# Patient Record
Sex: MALE | Race: WHITE | HISPANIC OR LATINO | URBAN - METROPOLITAN AREA
[De-identification: names, ages, dates, MRNs, and addresses within clinical notes are randomized per-mention and may not be internally consistent; named-entity substitution may affect disease eponyms.]

---

## 2017-11-03 ENCOUNTER — EMERGENCY (EMERGENCY)
Facility: HOSPITAL | Age: 27
LOS: 1 days | Discharge: HOME | End: 2017-11-03
Attending: EMERGENCY MEDICINE | Admitting: EMERGENCY MEDICINE
Payer: SELF-PAY

## 2017-11-03 VITALS
RESPIRATION RATE: 20 BRPM | SYSTOLIC BLOOD PRESSURE: 126 MMHG | DIASTOLIC BLOOD PRESSURE: 79 MMHG | OXYGEN SATURATION: 98 % | HEART RATE: 78 BPM

## 2017-11-03 VITALS
SYSTOLIC BLOOD PRESSURE: 124 MMHG | HEIGHT: 70 IN | DIASTOLIC BLOOD PRESSURE: 79 MMHG | RESPIRATION RATE: 20 BRPM | HEART RATE: 70 BPM | TEMPERATURE: 98 F | OXYGEN SATURATION: 97 % | WEIGHT: 265 LBS

## 2017-11-03 PROCEDURE — 99283 EMERGENCY DEPT VISIT LOW MDM: CPT

## 2017-11-03 PROCEDURE — 71046 X-RAY EXAM CHEST 2 VIEWS: CPT

## 2017-11-03 PROCEDURE — 71020: CPT | Mod: 26

## 2017-11-03 PROCEDURE — 99283 EMERGENCY DEPT VISIT LOW MDM: CPT | Mod: 25

## 2017-11-03 RX ORDER — IBUPROFEN 200 MG
600 TABLET ORAL ONCE
Qty: 0 | Refills: 0 | Status: COMPLETED | OUTPATIENT
Start: 2017-11-03 | End: 2017-11-03

## 2017-11-03 NOTE — ED STATDOCS - PROGRESS NOTE DETAILS
PA NOTE: pt seen and evaluated by intake doctor.lungs CTABL  x ray reviewed. pt counseled on possible findings on final read. pt requesting pain control. will give ibuprofen. pt advised to follow up with [facility]. All questions answered and concerns addressed. pt verbalized understanding and agreement with plan and dx. pt advised to follow up with PMD. pt advised to return to ed for worsening symptoms including fever, cp, sob. will dc.

## 2017-11-03 NOTE — ED STATDOCS - CONDUCTED A DETAILED DISCUSSION WITH PATIENT AND/OR GUARDIAN REGARDING, MDM
return to ED if symptoms worsen, persist or questions arise return to ED if symptoms worsen, persist or questions arise/radiology results/need for outpatient follow-up

## 2017-11-03 NOTE — ED STATDOCS - EYES, MLM
clear bilaterally.  Pupils equal, round, and reactive to light. clear bilaterally.  Pupils equal, round, and reactive to light.  + bilateral subconjunctival hemorrhages.

## 2017-11-03 NOTE — ED STATDOCS - CARE PLAN
Principal Discharge DX:	Choking Principal Discharge DX:	Choking  Secondary Diagnosis:	Subconjunctival hemorrhage of both eyes  Secondary Diagnosis:	Petechial hemorrhage

## 2017-11-03 NOTE — ED STATDOCS - ATTENDING CONTRIBUTION TO CARE
I, Aracelis Saini, performed the initial face to face bedside interview with this patient regarding history of present illness, review of symptoms and relevant past medical, social and family history.  I completed an independent physical examination.  I was the initial provider who evaluated this patient. I have signed out the follow up of any pending tests (i.e. labs, radiological studies) to the ACP.  I have communicated the patient’s plan of care and disposition with the ACP.

## 2017-11-03 NOTE — ED ADULT NURSE REASSESSMENT NOTE - NS ED NURSE REASSESS COMMENT FT1
Patient verbalized understanding of discharge instructions, need for followup.  Patient also provided with signs and symptoms to return to the emergency department immediately.  Patient A+Ox3, resps even and nonlabored, patient in no apparent distress.  Patient verbalized understanding, at this time patient has no further questions. discharged before full assessment note

## 2017-11-03 NOTE — ED STATDOCS - OBJECTIVE STATEMENT
28 y/o M pt with a PMHx of drug abuse and psychiatric Hx presents to the ED s/p choking episode s/p eating a sandwich 2 hours ago. The episode of choking has resolved but he now reports that he "is not returning to normal color." States that initially he was unable to swallow the piece of peanut butter sandwich but he was able to cough, vomit/swallow the food. Reports that he was coughing and feeling SOB during the event. Pt explains that he believes he was choking for 15 minutes. He was unable to call for help, he was home alone. This sensation has resolved now. Non smoker, social drinker. Denies HA, numbness, tingling, photophobia, diplopia, change in vision/hearing/gait/mental status/speech, focal weakness, neck pain, fever, chills, stiffness, hx of DVT/PE, leg swelling, CP, D/C, abdominal pain, change in urinary/bowel function, dysuria, hematuria, flank pain, or motor/sensory deficits. NKDA. 26 y/o M pt with a PMHx of drug abuse and psychiatric Hx presents to the ED s/p choking episode s/p eating a sandwich 2 hours ago. The episode of choking has resolved but he now reports that he "is not returning to normal color." States that initially he was unable to swallow the piece of peanut butter sandwich but he was able to cough, vomit/swallow the food. Reports that he was coughing and feeling SOB during the event. Pt explains that he believes he was choking for 15 minutes. He was unable to call for help, he was home alone. This sensation has resolved now. Non smoker, social drinker. Denies HA, numbness, tingling, photophobia, diplopia, change in vision/hearing/gait/mental status/speech, focal weakness, neck pain, fever, chills, stiffness, hx of DVT/PE, leg swelling, CP, D/C, abdominal pain, change in urinary/bowel function, dysuria, hematuria, flank pain, or motor/sensory deficits. NKDA.  He noted bleeding into his conjunctiva and facial color change and came in for eval. No sob at present.

## 2018-10-07 ENCOUNTER — EMERGENCY (EMERGENCY)
Facility: HOSPITAL | Age: 28
LOS: 1 days | Discharge: HOME | End: 2018-10-07
Payer: SELF-PAY

## 2018-10-07 DIAGNOSIS — R60.0 LOCALIZED EDEMA: ICD-10-CM

## 2018-10-07 PROCEDURE — 82140 ASSAY OF AMMONIA: CPT

## 2018-10-07 PROCEDURE — 71046 X-RAY EXAM CHEST 2 VIEWS: CPT | Mod: 26

## 2018-10-07 PROCEDURE — 81003 URINALYSIS AUTO W/O SCOPE: CPT

## 2018-10-07 PROCEDURE — 83880 ASSAY OF NATRIURETIC PEPTIDE: CPT

## 2018-10-07 PROCEDURE — 99284 EMERGENCY DEPT VISIT MOD MDM: CPT

## 2018-10-07 PROCEDURE — 99283 EMERGENCY DEPT VISIT LOW MDM: CPT | Mod: 25

## 2018-10-07 PROCEDURE — 71046 X-RAY EXAM CHEST 2 VIEWS: CPT

## 2018-10-07 PROCEDURE — 36415 COLL VENOUS BLD VENIPUNCTURE: CPT

## 2018-10-07 PROCEDURE — 82150 ASSAY OF AMYLASE: CPT

## 2018-10-07 PROCEDURE — 85027 COMPLETE CBC AUTOMATED: CPT

## 2018-10-07 PROCEDURE — 80053 COMPREHEN METABOLIC PANEL: CPT

## 2018-10-08 VITALS
HEART RATE: 64 BPM | RESPIRATION RATE: 20 BRPM | DIASTOLIC BLOOD PRESSURE: 67 MMHG | SYSTOLIC BLOOD PRESSURE: 144 MMHG | TEMPERATURE: 99 F

## 2020-10-15 ENCOUNTER — EMERGENCY (EMERGENCY)
Facility: HOSPITAL | Age: 30
LOS: 1 days | Discharge: HOME | End: 2020-10-15
Attending: EMERGENCY MEDICINE
Payer: SELF-PAY

## 2020-10-15 VITALS
OXYGEN SATURATION: 95 % | TEMPERATURE: 98 F | WEIGHT: 315 LBS | SYSTOLIC BLOOD PRESSURE: 122 MMHG | DIASTOLIC BLOOD PRESSURE: 74 MMHG | HEIGHT: 70 IN | HEART RATE: 63 BPM | RESPIRATION RATE: 16 BRPM

## 2020-10-15 PROCEDURE — 99283 EMERGENCY DEPT VISIT LOW MDM: CPT | Mod: 25

## 2020-10-15 PROCEDURE — 99284 EMERGENCY DEPT VISIT MOD MDM: CPT

## 2020-10-15 PROCEDURE — 96372 THER/PROPH/DIAG INJ SC/IM: CPT

## 2020-10-15 RX ORDER — KETOROLAC TROMETHAMINE 30 MG/ML
30 SYRINGE (ML) INJECTION ONCE
Refills: 0 | Status: DISCONTINUED | OUTPATIENT
Start: 2020-10-15 | End: 2020-10-15

## 2020-10-15 RX ADMIN — Medication 30 MILLIGRAM(S): at 19:23

## 2020-10-15 NOTE — ED PROVIDER NOTE - CLINICAL SUMMARY MEDICAL DECISION MAKING FREE TEXT BOX
HPI and PE suggestive of lumbar radiculopathy; analgesics given; patient walked out prior to reassessment, discharge instructions and prescriptions; unable to contact patient at this time

## 2020-10-15 NOTE — ED ADULT NURSE REASSESSMENT NOTE - NS ED NURSE REASSESS COMMENT FT1
Assumed pt care at change of shift. pt medicated for pain now not at bedside for reassessment. MD gage and myself unable to locate patient. suspect pt eloped.

## 2020-10-15 NOTE — ED ADULT TRIAGE NOTE - PRO INTERPRETER NEED 2
Pt presents with nausea beginning this morning and then began vomitting. Pt reports similar symptoms a month ago and was diagnosed with stomach virus. Hx of gastric ulcer. Denies diarrhea. Reports burning with urination, abd pain, n/v, sob.   
English

## 2020-11-09 ENCOUNTER — EMERGENCY (EMERGENCY)
Facility: HOSPITAL | Age: 30
LOS: 1 days | Discharge: HOME | End: 2020-11-09
Attending: EMERGENCY MEDICINE
Payer: MEDICAID

## 2020-11-09 VITALS
OXYGEN SATURATION: 96 % | RESPIRATION RATE: 18 BRPM | TEMPERATURE: 98 F | SYSTOLIC BLOOD PRESSURE: 135 MMHG | DIASTOLIC BLOOD PRESSURE: 85 MMHG | HEIGHT: 70 IN | WEIGHT: 315 LBS | HEART RATE: 74 BPM

## 2020-11-09 PROCEDURE — 99284 EMERGENCY DEPT VISIT MOD MDM: CPT | Mod: 25

## 2020-11-09 PROCEDURE — 72131 CT LUMBAR SPINE W/O DYE: CPT | Mod: 26

## 2020-11-09 PROCEDURE — 99284 EMERGENCY DEPT VISIT MOD MDM: CPT

## 2020-11-09 PROCEDURE — 96372 THER/PROPH/DIAG INJ SC/IM: CPT

## 2020-11-09 PROCEDURE — 72131 CT LUMBAR SPINE W/O DYE: CPT

## 2020-11-09 RX ORDER — LIDOCAINE 4 G/100G
1 CREAM TOPICAL ONCE
Refills: 0 | Status: COMPLETED | OUTPATIENT
Start: 2020-11-09 | End: 2020-11-09

## 2020-11-09 RX ORDER — DIAZEPAM 5 MG
5 TABLET ORAL ONCE
Refills: 0 | Status: DISCONTINUED | OUTPATIENT
Start: 2020-11-09 | End: 2020-11-09

## 2020-11-09 RX ORDER — METHOCARBAMOL 500 MG/1
1 TABLET, FILM COATED ORAL
Qty: 15 | Refills: 0
Start: 2020-11-09 | End: 2020-11-13

## 2020-11-09 RX ORDER — KETOROLAC TROMETHAMINE 30 MG/ML
30 SYRINGE (ML) INJECTION ONCE
Refills: 0 | Status: DISCONTINUED | OUTPATIENT
Start: 2020-11-09 | End: 2020-11-09

## 2020-11-09 RX ORDER — LIDOCAINE 4 G/100G
1 CREAM TOPICAL
Qty: 7 | Refills: 0
Start: 2020-11-09 | End: 2020-11-15

## 2020-11-09 RX ORDER — DEXAMETHASONE 0.5 MG/5ML
10 ELIXIR ORAL ONCE
Refills: 0 | Status: COMPLETED | OUTPATIENT
Start: 2020-11-09 | End: 2020-11-09

## 2020-11-09 RX ORDER — IBUPROFEN 200 MG
1 TABLET ORAL
Qty: 28 | Refills: 0
Start: 2020-11-09 | End: 2020-11-15

## 2020-11-09 RX ADMIN — LIDOCAINE 1 PATCH: 4 CREAM TOPICAL at 17:37

## 2020-11-09 RX ADMIN — Medication 30 MILLIGRAM(S): at 17:36

## 2020-11-09 RX ADMIN — Medication 10 MILLIGRAM(S): at 17:37

## 2020-11-09 RX ADMIN — Medication 5 MILLIGRAM(S): at 17:37

## 2020-11-09 NOTE — ED ADULT TRIAGE NOTE - CHIEF COMPLAINT QUOTE
Patient arrived to ED today with c/o lower back pains, spasms to his back.  Patient reports three weeks ago he has had symptoms on and off.

## 2020-11-09 NOTE — ED PROVIDER NOTE - NSFOLLOWUPINSTRUCTIONS_ED_ALL_ED_FT
Follow up with spine center.  Take medication as prescribed.  Come back with new or worsening symptoms.    Back Pain    Back pain is very common in adults. The cause of back pain is rarely dangerous and the pain often gets better over time. The cause of your back pain may not be known and may include strain of muscles or ligaments, degeneration of the spinal disks, or arthritis. Occasionally the pain may radiate down your leg(s). Over-the-counter medicines to reduce pain and inflammation are often the most helpful. Stretching and remaining active frequently helps the healing process.     SEEK IMMEDIATE MEDICAL CARE IF YOU HAVE ANY OF THE FOLLOWING SYMPTOMS: bowel or bladder control problems, unusual weakness or numbness in your arms or legs, nausea or vomiting, abdominal pain, fever, dizziness/lightheadedness.

## 2020-11-09 NOTE — ED PROVIDER NOTE - ATTENDING CONTRIBUTION TO CARE
ajm: PT WITH BACK PAIN. GIVEN DURATION OF SYMPTOMS WILL OBTAI IMAGING. NORMAL NUERO EXAM. NO NEED FOR MRI. PAIN CONTROL REASSESS

## 2020-11-09 NOTE — ED PROVIDER NOTE - OBJECTIVE STATEMENT
Pt is a 30y M with no PMH presenting for 4 .5 weeks of lower back pain radiating down R LE. Pt states he was here for same recently got a shot of medication which helped but the pain continued. 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 He has never had any imaging of his back before. He has taken alleve without relief. He reports some tingling in the toes. He has difficulty ambulating due to pain. He denies any drug or ETOH use. Pt denies fever/ abd pain/ chest pain/ bowel or bladder incontinence/ weakness. NKDA.

## 2020-11-09 NOTE — ED PROVIDER NOTE - NSFOLLOWUPCLINICS_GEN_ALL_ED_FT
[facility] - [facility]  Ortho/Spine  [address] [place] [id number]  Phone: [phone]  Fax:   Follow Up Time:

## 2020-11-09 NOTE — ED PROVIDER NOTE - PROGRESS NOTE DETAILS
Pts pain has improved. Awaiting CT. Pt found to have bulging disc at L5/S1. No canal stenosis. Will have f/u with spine and dc with meds. Pt found to have bulging disc at L5/S1. No canal stenosis. Will have f/u with spine and dc with meds. Pt is able to ambulate in ED.

## 2020-11-09 NOTE — ED PROVIDER NOTE - PATIENT PORTAL LINK FT
You can access the FollowMyHealth Patient Portal offered by [facility] by registering at the following website: [email or web address]. By joining [employer]’s FollowMyHealth portal, you will also be able to view your health information using other applications (apps) compatible with our system.

## 2020-11-09 NOTE — ED PROVIDER NOTE - CLINICAL SUMMARY MEDICAL DECISION MAKING FREE TEXT BOX
Pt is a 30y M with no PMH presenting with over 4 weeks of lower back pain radiating down LE. Will obtain CT and medicate for pain. Will reassess.

## 2020-11-12 ENCOUNTER — APPOINTMENT (OUTPATIENT)
Dept: PHYSICAL MEDICINE AND REHAB | Facility: CLINIC | Age: 30
End: 2020-11-12
Payer: MEDICAID

## 2020-11-12 VITALS
HEIGHT: 70 IN | SYSTOLIC BLOOD PRESSURE: 118 MMHG | BODY MASS INDEX: 45.1 KG/M2 | HEART RATE: 73 BPM | DIASTOLIC BLOOD PRESSURE: 78 MMHG | WEIGHT: 315 LBS | TEMPERATURE: 97 F

## 2020-11-12 DIAGNOSIS — Z78.9 OTHER SPECIFIED HEALTH STATUS: ICD-10-CM

## 2020-11-12 DIAGNOSIS — M54.16 RADICULOPATHY, LUMBAR REGION: ICD-10-CM

## 2020-11-12 PROCEDURE — 99203 OFFICE O/P NEW LOW 30 MIN: CPT

## 2020-11-12 NOTE — HISTORY OF PRESENT ILLNESS
[FreeTextEntry1] : Mr. ABHIJIT MCADAMS is a 30 year old male - went to ER twice over the past month\par \par Location: R LE > LBP\par Inciting Event: no triggering event - woke up with it. 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

## 2020-11-12 NOTE — ASSESSMENT
[FreeTextEntry1] : ABHIJIT MCADAMS,  a 30 year-old male with symptoms consistent with lumbar radiculopathy. Discussed all potential treatment options including PT, oral medications, and interventional procedures.\par - stop oral NSAID, trial medrol - medication use and potential SE reviewed. Cautioned in regards to chronic oral steroids and NSAIDs with risk of GI/cardiac/renal toxicities\par - Start PT as tolerated\par - Other modalities: heat/cold compress\par - Further discussed benefits of weight loss, ergonomic, and posture in long term management of the condition\par - F/u in 4-6 weeks or sooner as needed - if pain persist will consider advance imaging + discuss interventional procedures depending on findings

## 2020-11-12 NOTE — PHYSICAL EXAM
[FreeTextEntry1] : General: NAD, alert and oriented x 3\par Psych: normal affect, intact judgment and insight\par HEENT: NC/AT, normal visual tracking\par Pulmonary: normal respiratory effort, chest expansion appears symmetrical\par CV: extremities appear pink and well perfused\par Abd: non-distended\par Ext: no c/c/e\par skin: normal color, appearance, and temperature\par \par Lumbar/Hip Spine:\par Gait - non-antalgic, able to heel and toe walk\par Inspection: normal muscle bulk without asymmetry\par Palpation: no TTP over PSIS, lumbar paraspinal, greater trochanter, sacroiliac joints, piriformis\par ROM lumbar: pain with 1/4 full flexion > 10 extension\par MMT: 5/5 bilateral lower extremities (HF, KE, KF, DF, PF, EHL) - noted tremor with resist test but 5/5\par Reflexes: symmetric bilateral ankle jerk (1+), patella right (1+), left (trace)\par Sensory: intact to light touch in all dermatomes of the bilateral lower extremities\par Provocative testing:\par Facet loading - negative\par SLR - negative\par JAM/FADIR - negative

## 2020-11-12 NOTE — DATA REVIEWED
[CT Scan] : CT Scan [FreeTextEntry1] : lumbar CT\par Posterior disc bulge and bilateral facet arthropathy at L5/S1 resulting in narrowing of the bilateral neural foramen, right greater than left, without significant spinal canal stenosis.\par

## 2021-02-12 ENCOUNTER — EMERGENCY (EMERGENCY)
Facility: HOSPITAL | Age: 31
LOS: 1 days | Discharge: HOME | End: 2021-02-12
Payer: MEDICAID

## 2021-02-12 VITALS
TEMPERATURE: 98 F | SYSTOLIC BLOOD PRESSURE: 126 MMHG | HEART RATE: 87 BPM | HEIGHT: 70 IN | DIASTOLIC BLOOD PRESSURE: 89 MMHG | OXYGEN SATURATION: 96 % | RESPIRATION RATE: 18 BRPM

## 2021-02-12 LAB — SARS-COV-2 RNA SPEC QL NAA+PROBE: DETECTED

## 2021-02-12 PROCEDURE — U0003: CPT

## 2021-02-12 PROCEDURE — U0005: CPT

## 2021-02-12 PROCEDURE — 99284 EMERGENCY DEPT VISIT MOD MDM: CPT

## 2021-02-12 PROCEDURE — 99283 EMERGENCY DEPT VISIT LOW MDM: CPT

## 2021-02-12 NOTE — ED PROVIDER NOTE - PATIENT PORTAL LINK FT
You can access the FollowMyHealth Patient Portal offered by [facility] by registering at the following website: [email or web address]. By joining [employer]’s FollowMyHealth portal, you will also be able to view your health information using other applications (apps) compatible with our system.

## 2021-02-12 NOTE — ED PROVIDER NOTE - OBJECTIVE STATEMENT
31 y/o M presents to ED for COVID testing. Pt currently presents with no test/smell. No chest pain, sob, cough, abd pain, n/v/d, headache, dizziness. - sick contacts. No recent travel. Pt well appearing. NAD.

## 2021-02-22 ENCOUNTER — EMERGENCY (EMERGENCY)
Facility: HOSPITAL | Age: 31
LOS: 1 days | Discharge: HOME | End: 2021-02-22
Payer: MEDICAID

## 2021-02-22 VITALS
HEIGHT: 70 IN | RESPIRATION RATE: 20 BRPM | HEART RATE: 61 BPM | WEIGHT: 210.1 LBS | TEMPERATURE: 98 F | OXYGEN SATURATION: 96 % | DIASTOLIC BLOOD PRESSURE: 86 MMHG | SYSTOLIC BLOOD PRESSURE: 135 MMHG

## 2021-02-22 LAB — SARS-COV-2 RNA SPEC QL NAA+PROBE: SIGNIFICANT CHANGE UP

## 2021-02-22 PROCEDURE — 99283 EMERGENCY DEPT VISIT LOW MDM: CPT

## 2021-02-22 PROCEDURE — U0005: CPT

## 2021-02-22 PROCEDURE — 99284 EMERGENCY DEPT VISIT MOD MDM: CPT

## 2021-02-22 PROCEDURE — U0003: CPT

## 2021-02-22 NOTE — ED PROVIDER NOTE - OBJECTIVE STATEMENT
Pt presenting to the ER for COVID-19 testing. Pt reports testing + for COVID-19 10 days ago and would like to be retested at this time. Pt reports slight loss of taste and smell. No other symptoms.

## 2021-02-22 NOTE — ED PROVIDER NOTE - PATIENT PORTAL LINK FT
You can access the FollowMyHealth Patient Portal offered by [facility] by registering at the following website: [email or web address]. By joining [employer]’s FollowMyHealth portal, you will also be able to view your health information using other applications (apps) compatible with our system.

## 2021-02-22 NOTE — ED PROVIDER NOTE - CLINICAL SUMMARY MEDICAL DECISION MAKING FREE TEXT BOX
Pt nontoxic appearing, stable vitals, ambulatory with stable saturation without supplemental oxygen. PT does not meet criteria listed in most updated guidelines as per [facility] protocol/algorithm for admission at this time. pt advised about self-quarantine instructions until negative test results and/or symptom resolution. pt advised on hand hygiene, monitoring of symptoms, antipyretic use as well as and fu with primary care provider. Instructions given in pre-printed copy. COVID-19 PCR sent. Pt given strict return instructions.

## 2021-07-09 NOTE — ED PROVIDER NOTE - CONSTITUTIONAL, MLM
normal... Well appearing, awake, alert, oriented to person, place, time/situation and in no apparent distress. 13

## 2022-10-12 ENCOUNTER — NON-APPOINTMENT (OUTPATIENT)
Age: 32
End: 2022-10-12

## 2022-10-12 VITALS
RESPIRATION RATE: 20 BRPM | HEART RATE: 70 BPM | BODY MASS INDEX: 41.09 KG/M2 | TEMPERATURE: 98 F | DIASTOLIC BLOOD PRESSURE: 82 MMHG | SYSTOLIC BLOOD PRESSURE: 120 MMHG | WEIGHT: 287 LBS | HEIGHT: 70 IN | OXYGEN SATURATION: 97 %

## 2022-10-12 DIAGNOSIS — Z00.00 ENCOUNTER FOR GENERAL ADULT MEDICAL EXAMINATION W/OUT ABNORMAL FINDINGS: ICD-10-CM

## 2022-10-12 DIAGNOSIS — F31.9 BIPOLAR DISORDER, UNSPECIFIED: ICD-10-CM

## 2022-10-12 DIAGNOSIS — J30.2 OTHER SEASONAL ALLERGIC RHINITIS: ICD-10-CM

## 2022-10-12 RX ORDER — MELOXICAM 15 MG/1
15 TABLET ORAL DAILY
Qty: 30 | Refills: 0 | Status: DISCONTINUED | COMMUNITY
Start: 2020-11-20 | End: 2022-10-12

## 2022-10-12 RX ORDER — RANOLAZINE 500 MG/1
500 TABLET, FILM COATED, EXTENDED RELEASE ORAL
Refills: 0 | Status: ACTIVE | COMMUNITY
Start: 2022-10-12

## 2022-10-12 RX ORDER — METHYLPREDNISOLONE 4 MG/1
4 TABLET ORAL
Qty: 1 | Refills: 0 | Status: DISCONTINUED | COMMUNITY
Start: 2020-11-12 | End: 2022-10-12

## 2022-10-12 NOTE — HISTORY OF PRESENT ILLNESS
[FreeTextEntry1] :  "medical evaluation" [de-identified] : 33 y/o male patient with PMH seasonal allergies, and  Bi-Polar 1. He states he was diagnosed in 2015 at [facility] in-patient psy. He recently one week ago was hospitalized for a manic episode, he states he stop taking some of his medications. After adjustment of his medication, He states now he is feeling ok and is looking forward to begin to see new psychiatrist. Denies any fever, chills, cp, sob, palpitations. Denies any suicidal ideation, denies hurting himself and others. Denies any other medical problems.

## 2022-10-12 NOTE — PLAN
[FreeTextEntry1] : 31 y/o male PMH Bipolar, anxiety, and seasonal allergies here for physical exam \par \par Severe obesity : Lifestyle modifications discussed\par Patient will continue to walk for exercise\par Diet and the importance of increasing intake of more vegetables and fruits\par Bipolar continue Ranexa, and to f/u with psych,\par patient states he will address with psy his need to go back on Seroquel for improved sleep\par Seasonal allergies Claritin OTC\par Basic labs cbc, bmp, thyroid, and lipid panel\par f/u in one month for lab results\par \par \par \par

## 2022-10-12 NOTE — HEALTH RISK ASSESSMENT
[Good] : ~his/her~ current health as good [Fair] :  ~his/her~ mood as fair [Former] : Former [No] : No [No falls in past year] : Patient reported no falls in the past year [0] : 2) Feeling down, depressed, or hopeless: Not at all (0) [Patient declined Low Dose CT Scan] : Patient declined Low Dose CT Scan [Patient declined Retinal Exam] : Patient declined Retinal Exam. [HIV test declined] : HIV test declined [With Family] : lives with family [Unemployed] : unemployed [Single] : single [Feels Safe at Home] : Feels safe at home [Fully functional (bathing, dressing, toileting, transferring, walking, feeding)] : Fully functional (bathing, dressing, toileting, transferring, walking, feeding) [Fully functional (using the telephone, shopping, preparing meals, housekeeping, doing laundry, using] : Fully functional and needs no help or supervision to perform IADLs (using the telephone, shopping, preparing meals, housekeeping, doing laundry, using transportation, managing medications and managing finances) [Reports normal functional visual acuity (ie: able to read med bottle)] : Reports normal functional visual acuity [Smoke Detector] : smoke detector [Carbon Monoxide Detector] : carbon monoxide detector [Seat Belt] :  uses seat belt [Change in mental status noted] : No change in mental status noted [Language] : denies difficulty with language [Behavior] : denies difficulty with behavior [Learning/Retaining New Information] : denies difficulty learning/retaining new information [Handling Complex Tasks] : denies difficulty handling complex tasks [Reasoning] : denies difficulty with reasoning [Spatial Ability and Orientation] : denies difficulty with spatial ability and orientation [Sexually Active] : not sexually active [High Risk Behavior] : no high risk behavior [Reports changes in hearing] : Reports no changes in hearing [Reports changes in vision] : Reports no changes in vision [Reports changes in dental health] : Reports no changes in dental health [Guns at Home] : no guns at home [Travel to Developing Areas] : does not  travel to developing areas

## 2022-10-12 NOTE — COUNSELING
[Behavioral health counseling provided] : Behavioral health counseling provided [Sleep ___ hours/day] : Sleep [unfilled] hours/day [Engage in a relaxing activity] : Engage in a relaxing activity [Potential consequences of obesity discussed] : Potential consequences of obesity discussed [Benefits of weight loss discussed] : Benefits of weight loss discussed [Encouraged to increase physical activity] : Encouraged to increase physical activity [Decrease Portions] : decrease portions [None] : None

## 2024-11-19 NOTE — ED ADULT NURSE NOTE - CCCP TRG CHIEF CMPLNT
Pt to ED via EMS a/o x4 with c/o drug overdose and SI. Pt stated the overdose was intentional to commit suicide. Pt denies any HI. Per EMS pt was found down on the needle he I injected with. Pt was given 10 total IN narcan and 2mg IV narcan. Pt denies any other drug use. Per EMS pt also ran out the back door of his home when woken up by the narcan and fell down. Pt arrives in a Ccollar. Pt placed on full cardiac monitor. Call light is in reach   
back pain general

## 2025-04-01 NOTE — ED STATDOCS - NS_ATTENDINGSCRIBE_ED_ALL_ED
no rashes , no jaundice present I personally performed the service described in the documentation recorded by the scribe in my presence, and it accurately and completely records my words and actions.
